# Patient Record
Sex: FEMALE | Race: WHITE
[De-identification: names, ages, dates, MRNs, and addresses within clinical notes are randomized per-mention and may not be internally consistent; named-entity substitution may affect disease eponyms.]

---

## 2021-04-20 ENCOUNTER — HOSPITAL ENCOUNTER (OUTPATIENT)
Dept: HOSPITAL 63 - DXRAD | Age: 51
End: 2021-04-20
Attending: FAMILY MEDICINE
Payer: COMMERCIAL

## 2021-04-20 DIAGNOSIS — M19.071: Primary | ICD-10-CM

## 2021-04-20 DIAGNOSIS — M20.11: ICD-10-CM

## 2021-04-20 PROCEDURE — 73630 X-RAY EXAM OF FOOT: CPT

## 2021-04-20 NOTE — RAD
3 views right foot

 4/20/2021 11:35 AM



Indication: Reason: RIGHT FOOT PAIN. / Spl. Instructions:  / History: 



Comparison: None



Findings: There is no acute fracture or dislocation. There is a hallux valgus deformity. Associated d
egenerative change at the first MTP joint is seen with subchondral sclerosis involving the proximal p
halanx and mild joint space narrowing. No acute soft tissue changes are seen.



Impression: 

1. No evidence of acute osseous  abnormality

2. Hallux valgus deformity with associated mild degenerative change



Electronically signed by: Reuben Connelly MD (4/20/2021 4:03 PM) [id number]

## 2021-05-27 ENCOUNTER — HOSPITAL ENCOUNTER (OUTPATIENT)
Dept: HOSPITAL 63 - LAB | Age: 51
End: 2021-05-27
Payer: COMMERCIAL

## 2021-05-27 DIAGNOSIS — Z02.83: Primary | ICD-10-CM

## 2021-05-27 PROCEDURE — 36415 COLL VENOUS BLD VENIPUNCTURE: CPT
